# Patient Record
Sex: FEMALE | Race: WHITE | NOT HISPANIC OR LATINO | Employment: FULL TIME | URBAN - METROPOLITAN AREA
[De-identification: names, ages, dates, MRNs, and addresses within clinical notes are randomized per-mention and may not be internally consistent; named-entity substitution may affect disease eponyms.]

---

## 2019-03-31 ENCOUNTER — OFFICE VISIT (OUTPATIENT)
Dept: URGENT CARE | Age: 54
End: 2019-03-31
Payer: COMMERCIAL

## 2019-03-31 VITALS
SYSTOLIC BLOOD PRESSURE: 150 MMHG | OXYGEN SATURATION: 98 % | DIASTOLIC BLOOD PRESSURE: 81 MMHG | HEART RATE: 110 BPM | WEIGHT: 225 LBS | RESPIRATION RATE: 20 BRPM | TEMPERATURE: 101.9 F

## 2019-03-31 DIAGNOSIS — J11.1 INFLUENZA: Primary | ICD-10-CM

## 2019-03-31 DIAGNOSIS — H66.91 RIGHT OTITIS MEDIA, UNSPECIFIED OTITIS MEDIA TYPE: ICD-10-CM

## 2019-03-31 LAB — S PYO AG THROAT QL: NEGATIVE

## 2019-03-31 PROCEDURE — 99203 OFFICE O/P NEW LOW 30 MIN: CPT | Performed by: PHYSICIAN ASSISTANT

## 2019-03-31 PROCEDURE — 87147 CULTURE TYPE IMMUNOLOGIC: CPT | Performed by: PHYSICIAN ASSISTANT

## 2019-03-31 PROCEDURE — 87070 CULTURE OTHR SPECIMN AEROBIC: CPT | Performed by: PHYSICIAN ASSISTANT

## 2019-03-31 RX ORDER — FLUTICASONE PROPIONATE 50 MCG
1 SPRAY, SUSPENSION (ML) NASAL DAILY
Qty: 16 G | Refills: 0 | Status: SHIPPED | OUTPATIENT
Start: 2019-03-31

## 2019-03-31 RX ORDER — AZITHROMYCIN 250 MG/1
TABLET, FILM COATED ORAL
Qty: 6 TABLET | Refills: 0 | Status: SHIPPED | OUTPATIENT
Start: 2019-03-31 | End: 2019-04-04

## 2019-03-31 NOTE — PROGRESS NOTES
[facility]        NAME: Jocelyn Álvarez is a 49 y o  female  : 1965    MRN: [id number]  DATE: 2019  TIME: 2:33 PM    Assessment and Plan   Influenza [J11 1]  1  Influenza  azithromycin (ZITHROMAX) 250 mg tablet    fluticasone (FLONASE) 50 mcg/act nasal spray    loratadine (CLARITIN REDITABS) 10 MG dissolvable tablet    POCT rapid strepA    Throat culture   2  Right otitis media, unspecified otitis media type  azithromycin (ZITHROMAX) 250 mg tablet    fluticasone (FLONASE) 50 mcg/act nasal spray    loratadine (CLARITIN REDITABS) 10 MG dissolvable tablet         Patient Instructions       Continue to monitor symptoms  Drink plenty of fluids  Take over-the-counter acetaminophen or ibuprofen for fever control  Follow up with family doctor this week  Go to emergency room if new or worsening symptoms develop  Chief Complaint     Chief Complaint   Patient presents with    Sinusitis     ear pressure; coughing; fevers; sinus pressure ; 10 days;   feeling better then worse         History of Present Illness       Patient has one-week history of nasal congestion, postnasal drip, ear pressure  Patient states starting last night she started spiking high fevers  Patient almost 102 in exam room  Patient states that she has worsening right ear pain today  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  Patient has been exposed to both strep and influenza in the past week  Patient denies any sore throat  Eating and drinking normally  Patient has been taking Tylenol cold and flu for symptoms, however, she ran out  Review of Systems   Review of Systems   Constitutional: Positive for chills and fever  Negative for diaphoresis and fatigue  HENT: Positive for ear pain, postnasal drip, sinus pressure, sinus pain, sneezing and sore throat  Negative for congestion, ear discharge, rhinorrhea and voice change  Eyes: Negative  Respiratory: Positive for cough   Negative for chest tightness, shortness of breath and wheezing  Cardiovascular: Negative for chest pain and palpitations  Gastrointestinal: Negative for abdominal pain, constipation, diarrhea, nausea and vomiting  Endocrine: Negative  Genitourinary: Negative for dysuria  Musculoskeletal: Negative for back pain, myalgias and neck pain  Skin: Negative for pallor and rash  Allergic/Immunologic: Negative  Neurological: Negative for dizziness, syncope and headaches  Hematological: Negative  Psychiatric/Behavioral: Negative  Current Medications       Current Outpatient Medications:     azithromycin (ZITHROMAX) 250 mg tablet, Take 2 tablets today then 1 tablet daily x 4 days, Disp: 6 tablet, Rfl: 0    fluticasone (FLONASE) 50 mcg/act nasal spray, 1 spray into each nostril daily, Disp: 16 g, Rfl: 0    loratadine (CLARITIN REDITABS) 10 MG dissolvable tablet, Take 1 tablet (10 mg total) by mouth daily for 14 days, Disp: 14 tablet, Rfl: 0    Current Allergies     Allergies as of 03/31/2019 - Reviewed 03/31/2019   Allergen Reaction Noted    Penicillins Hives 03/31/2019    Tetracycline Hives 03/31/2019            The following portions of the patient's history were reviewed and updated as appropriate: allergies, current medications, past family history, past medical history, past social history, past surgical history and problem list      History reviewed  No pertinent past medical history  History reviewed  No pertinent surgical history  History reviewed  No pertinent family history  Medications have been verified  Objective   /81   Pulse (!) 110   Temp (!) 101 9 °F (38 8 °C)   Resp 20   Wt 102 kg (225 lb)   SpO2 98%        Physical Exam     Physical Exam   Constitutional: She appears well-developed and well-nourished  No distress  HENT:   Head: Normocephalic and atraumatic     Right Ear: Hearing, external ear and ear canal normal  Tympanic membrane is injected, erythematous and bulging  Left Ear: Hearing, external ear and ear canal normal  Tympanic membrane is bulging  Tympanic membrane is not injected and not erythematous  Nose: Mucosal edema and rhinorrhea present  Right sinus exhibits maxillary sinus tenderness  Left sinus exhibits maxillary sinus tenderness  Mouth/Throat: Oropharynx is clear and moist  No oropharyngeal exudate  Eyes: Pupils are equal, round, and reactive to light  Conjunctivae and EOM are normal  Right eye exhibits no discharge  Left eye exhibits no discharge  Neck: Normal range of motion  Neck supple  Cardiovascular: Normal rate, regular rhythm, normal heart sounds and intact distal pulses  Pulmonary/Chest: Effort normal and breath sounds normal  No respiratory distress  She has no wheezes  She has no rales  Lymphadenopathy:     She has no cervical adenopathy  Skin: Skin is warm  Capillary refill takes less than 2 seconds  No rash noted  She is not diaphoretic  Nursing note and vitals reviewed

## 2019-03-31 NOTE — PATIENT INSTRUCTIONS
Continue to monitor symptoms  Drink plenty of fluids  Take over-the-counter acetaminophen or ibuprofen for fever control  Follow up with family doctor this week  Go to emergency room if new or worsening symptoms develop  Influenza   WHAT YOU NEED TO KNOW:   Influenza (the flu) is an infection caused by the influenza virus  The flu is easily spread when an infected person coughs, sneezes, or has close contact with others  You may be able to spread the flu to others for 1 week or longer after signs or symptoms appear  DISCHARGE INSTRUCTIONS:   Call 911 for any of the following:   · You have trouble breathing, and your lips look purple or blue  · You have a seizure  Return to the emergency department if:   · You are dizzy, or you are urinating less or not at all  · You have a headache with a stiff neck, and you feel tired or confused  · You have new pain or pressure in your chest     · Your symptoms, such as shortness of breath, vomiting, or diarrhea, get worse  · Your symptoms, such as fever and coughing, seem to get better, but then get worse  Contact your healthcare provider if:   · You have new muscle pain or weakness  · You have questions or concerns about your condition or care  Medicines: You may need any of the following:  · Acetaminophen  decreases pain and fever  It is available without a doctor's order  Ask how much to take and how often to take it  Follow directions  Acetaminophen can cause liver damage if not taken correctly  · NSAIDs , such as ibuprofen, help decrease swelling, pain, and fever  This medicine is available with or without a doctor's order  NSAIDs can cause stomach bleeding or kidney problems in certain people  If you take blood thinner medicine, always ask your healthcare provider if NSAIDs are safe for you  Always read the medicine label and follow directions  · Antivirals  help fight a viral infection  · Take your medicine as directed    Contact your healthcare provider if you think your medicine is not helping or if you have side effects  Tell him or her if you are allergic to any medicine  Keep a list of the medicines, vitamins, and herbs you take  Include the amounts, and when and why you take them  Bring the list or the pill bottles to follow-up visits  Carry your medicine list with you in case of an emergency  Rest  as much as you can to help you recover  Drink liquids as directed  to help prevent dehydration  Ask how much liquid to drink each day and which liquids are best for you  Prevent the spread of influenza:   · Wash your hands often  Use soap and water  Wash your hands after you use the bathroom, change a child's diapers, or sneeze  Wash your hands before you prepare or eat food  Use gel hand cleanser when soap and water are not available  Do not touch your eyes, nose, or mouth unless you have washed your hands first            · Cover your mouth when you sneeze or cough  Cough into a tissue or the bend of your arm  · Clean shared items with a germ-killing   Clean table surfaces, doorknobs, and light switches  Do not share towels, silverware, and dishes with people who are sick  Wash bed sheets, towels, silverware, and dishes with soap and water  · Wear a mask  over your mouth and nose if you are sick or are near anyone who is sick  · Stay away from others  if you are sick  · Influenza vaccine  helps prevent influenza (flu)  Everyone older than 6 months should get a yearly influenza vaccine  Get the vaccine as soon as it is available, usually in September or October each year  Follow up with your healthcare provider as directed:  Write down your questions so you remember to ask them during your visits  © 2017 [employer] Information is for End User's use only and may not be sold, redistributed or otherwise used for commercial purposes   All illustrations and images included in CareNotes® are the copyrighted property of [employer]  or [employer]  The above information is an  only  It is not intended as medical advice for individual conditions or treatments  Talk to your doctor, nurse or pharmacist before following any medical regimen to see if it is safe and effective for you

## 2019-03-31 NOTE — LETTER
March 31, 2019     Patient: Marco Coreas   YOB: 1965   Date of Visit: 3/31/2019       To Whom It May Concern: It is my medical opinion that Ambika Mariano may return to work on 4/2/2019  If you have any questions or concerns, please don't hesitate to call           Sincerely,        Roman Duran PA-C    CC: No Recipients

## 2019-04-01 LAB — BACTERIA THROAT CULT: ABNORMAL

## 2023-03-05 ENCOUNTER — OFFICE VISIT (OUTPATIENT)
Dept: URGENT CARE | Age: 58
End: 2023-03-05

## 2023-03-05 VITALS
TEMPERATURE: 97.6 F | OXYGEN SATURATION: 100 % | DIASTOLIC BLOOD PRESSURE: 72 MMHG | BODY MASS INDEX: 38.43 KG/M2 | SYSTOLIC BLOOD PRESSURE: 124 MMHG | HEART RATE: 100 BPM | RESPIRATION RATE: 18 BRPM | WEIGHT: 225.1 LBS | HEIGHT: 64 IN

## 2023-03-05 DIAGNOSIS — H60.502 ACUTE OTITIS EXTERNA OF LEFT EAR, UNSPECIFIED TYPE: ICD-10-CM

## 2023-03-05 DIAGNOSIS — H65.192 OTHER NON-RECURRENT ACUTE NONSUPPURATIVE OTITIS MEDIA OF LEFT EAR: Primary | ICD-10-CM

## 2023-03-05 RX ORDER — PREDNISONE 10 MG/1
TABLET ORAL
Qty: 15 TABLET | Refills: 0 | Status: SHIPPED | OUTPATIENT
Start: 2023-03-05 | End: 2023-03-10

## 2023-03-05 RX ORDER — LEVOTHYROXINE SODIUM 0.15 MG/1
150 TABLET ORAL DAILY
COMMUNITY
Start: 2023-02-09

## 2023-03-05 RX ORDER — LISINOPRIL AND HYDROCHLOROTHIAZIDE 12.5; 1 MG/1; MG/1
1 TABLET ORAL DAILY
COMMUNITY
Start: 2022-12-08

## 2023-03-05 RX ORDER — OFLOXACIN 3 MG/ML
10 SOLUTION AURICULAR (OTIC) 2 TIMES DAILY
Qty: 5 ML | Refills: 0 | Status: SHIPPED | OUTPATIENT
Start: 2023-03-05

## 2023-03-05 RX ORDER — CLINDAMYCIN HYDROCHLORIDE 300 MG/1
300 CAPSULE ORAL 3 TIMES DAILY
Qty: 21 CAPSULE | Refills: 0 | Status: SHIPPED | OUTPATIENT
Start: 2023-03-05 | End: 2023-03-12

## 2023-03-06 NOTE — PROGRESS NOTES
[facility]        NAME: Darrick Steiner is a 63 y o  female  : 1965    MRN: [id number]  DATE: 2023  TIME: 8:11 PM    Assessment and Plan   Other non-recurrent acute nonsuppurative otitis media of left ear [H65 192]  1  Other non-recurrent acute nonsuppurative otitis media of left ear  clindamycin (CLEOCIN) 300 MG capsule    ofloxacin (FLOXIN) 0 3 % otic solution    predniSONE 10 mg tablet      2  Acute otitis externa of left ear, unspecified type              Patient Instructions     Take medications as directed until completed  Motrin and or Tylenol as needed for pain  Follow up with PCP in 3-5 days  Proceed to  ER if symptoms worsen  Chief Complaint     Chief Complaint   Patient presents with   • Earache     Left ear pain, completed 5 days of Z-Pac, continues with pain          History of Present Illness       36-year-old female presents with left ear pain  Patient reports pain ongoing for over a week now  Patient recently finished a course of azithromycin but continues to have a lot of pain pressure and discomfort in left ear  Reports decreased hearing on the left side  No fevers reported  Denies any chest pain shortness of breath  No drainage from the ear  Earache   There is pain in the left ear  This is a new problem  The current episode started 1 to 4 weeks ago  The problem occurs constantly  The problem has been waxing and waning  There has been no fever  The pain is moderate  Associated symptoms include hearing loss  Pertinent negatives include no abdominal pain, coughing, diarrhea, ear discharge, rhinorrhea or sore throat  She has tried antibiotics for the symptoms  The treatment provided no relief  Review of Systems   Review of Systems   Constitutional: Negative  Negative for fatigue and fever  HENT: Positive for ear pain and hearing loss  Negative for ear discharge, rhinorrhea and sore throat  Eyes: Negative  Respiratory: Negative  Negative for cough  Cardiovascular: Negative  Gastrointestinal: Negative  Negative for abdominal pain and diarrhea  Musculoskeletal: Negative  Skin: Negative  Neurological: Negative  Current Medications       Current Outpatient Medications:   •  clindamycin (CLEOCIN) 300 MG capsule, Take 1 capsule (300 mg total) by mouth 3 (three) times a day for 7 days, Disp: 21 capsule, Rfl: 0  •  levothyroxine 150 mcg tablet, Take 150 mcg by mouth daily, Disp: , Rfl:   •  lisinopril-hydrochlorothiazide (PRINZIDE,ZESTORETIC) 10-12 5 MG per tablet, Take 1 tablet by mouth daily, Disp: , Rfl:   •  ofloxacin (FLOXIN) 0 3 % otic solution, Administer 10 drops into the left ear 2 (two) times a day, Disp: 5 mL, Rfl: 0  •  predniSONE 10 mg tablet, Take 5 tablets (50 mg total) by mouth daily for 1 day, THEN 4 tablets (40 mg total) daily for 1 day, THEN 3 tablets (30 mg total) daily for 1 day, THEN 2 tablets (20 mg total) daily for 1 day, THEN 1 tablet (10 mg total) daily for 1 day , Disp: 15 tablet, Rfl: 0  •  fluticasone (FLONASE) 50 mcg/act nasal spray, 1 spray into each nostril daily (Patient not taking: Reported on 3/5/2023), Disp: 16 g, Rfl: 0  •  loratadine (CLARITIN REDITABS) 10 MG dissolvable tablet, Take 1 tablet (10 mg total) by mouth daily for 14 days (Patient not taking: Reported on 3/5/2023), Disp: 14 tablet, Rfl: 0    Current Allergies     Allergies as of 03/05/2023 - Reviewed 03/05/2023   Allergen Reaction Noted   • Penicillins Hives 03/31/2019   • Tetracycline Hives 03/31/2019            The following portions of the patient's history were reviewed and updated as appropriate: allergies, current medications, past family history, past medical history, past social history, past surgical history and problem list      History reviewed  No pertinent past medical history  Past Surgical History:   Procedure Laterality Date   • PARTIAL HYSTERECTOMY         History reviewed  No pertinent family history        Medications have been verified  Objective   /72   Pulse 100   Temp 97 6 °F (36 4 °C)   Resp 18   Ht 5' 4" (1 626 m)   Wt 102 kg (225 lb 1 6 oz)   SpO2 100%   BMI 38 64 kg/m²   No LMP recorded  Physical Exam     Physical Exam  Vitals and nursing note reviewed  Constitutional:       General: She is not in acute distress  Appearance: Normal appearance  She is well-developed  HENT:      Head: Normocephalic and atraumatic  Right Ear: Hearing, tympanic membrane, ear canal and external ear normal  There is no impacted cerumen  Left Ear: Hearing, ear canal and external ear normal  Swelling and tenderness present  A middle ear effusion is present  There is no impacted cerumen  Tympanic membrane is injected and erythematous  Nose: Nose normal       Mouth/Throat:      Pharynx: Uvula midline  No oropharyngeal exudate  Eyes:      General:         Right eye: No discharge  Left eye: No discharge  Conjunctiva/sclera: Conjunctivae normal    Cardiovascular:      Rate and Rhythm: Normal rate and regular rhythm  Heart sounds: Normal heart sounds  No murmur heard  Pulmonary:      Effort: Pulmonary effort is normal  No respiratory distress  Breath sounds: Normal breath sounds  No wheezing or rales  Abdominal:      General: Bowel sounds are normal       Palpations: Abdomen is soft  Tenderness: There is no abdominal tenderness  Musculoskeletal:         General: Normal range of motion  Cervical back: Normal range of motion and neck supple  Lymphadenopathy:      Cervical: No cervical adenopathy  Skin:     General: Skin is warm and dry  Neurological:      Mental Status: She is alert and oriented to person, place, and time     Psychiatric:         Mood and Affect: Mood normal